# Patient Record
Sex: FEMALE | Race: WHITE
[De-identification: names, ages, dates, MRNs, and addresses within clinical notes are randomized per-mention and may not be internally consistent; named-entity substitution may affect disease eponyms.]

---

## 2018-08-20 ENCOUNTER — HOSPITAL ENCOUNTER (OUTPATIENT)
Dept: HOSPITAL 92 - SCSER | Age: 18
Discharge: HOME | End: 2018-08-20
Attending: SURGERY
Payer: COMMERCIAL

## 2018-08-20 DIAGNOSIS — K35.80: Primary | ICD-10-CM

## 2018-08-20 LAB
ALBUMIN SERPL BCG-MCNC: 4.4 G/DL (ref 3.5–5)
ALP SERPL-CCNC: 40 U/L (ref 40–150)
ALT SERPL W P-5'-P-CCNC: 11 U/L (ref 8–55)
ANION GAP SERPL CALC-SCNC: 15 MMOL/L (ref 10–20)
AST SERPL-CCNC: 17 U/L (ref 5–34)
BILIRUB SERPL-MCNC: 1 MG/DL (ref 0.2–1.2)
BUN SERPL-MCNC: 14 MG/DL (ref 7–18.7)
CALCIUM SERPL-MCNC: 9.7 MG/DL (ref 7.8–10.44)
CHLORIDE SERPL-SCNC: 105 MMOL/L (ref 98–107)
CO2 SERPL-SCNC: 22 MMOL/L (ref 22–29)
CREAT CL PREDICTED SERPL C-G-VRATE: 0 ML/MIN (ref 70–130)
GLOBULIN SER CALC-MCNC: 3.6 G/DL (ref 2.4–3.5)
GLUCOSE SERPL-MCNC: 94 MG/DL (ref 70–105)
HGB BLD-MCNC: 14.4 G/DL (ref 12–16)
LIPASE SERPL-CCNC: 13 U/L (ref 8–78)
MCH RBC QN AUTO: 27.8 PG (ref 25–35)
MCV RBC AUTO: 80.4 FL (ref 78–98)
MDIFF COMPLETE?: YES
PLATELET # BLD AUTO: 233 THOU/UL (ref 130–400)
PLATELET BLD QL SMEAR: (no result)
POTASSIUM SERPL-SCNC: 4.1 MMOL/L (ref 3.5–5.1)
PREGS CONTROL BACKGROUND?: (no result)
PREGS CONTROL BAR APPEAR?: YES
RBC # BLD AUTO: 5.16 MILL/UL (ref 4–5.2)
SODIUM SERPL-SCNC: 138 MMOL/L (ref 136–145)
SP GR UR STRIP: 1.01 (ref 1–1.03)
WBC # BLD AUTO: 16.7 THOU/UL (ref 4.8–10.8)

## 2018-08-20 PROCEDURE — 88304 TISSUE EXAM BY PATHOLOGIST: CPT

## 2018-08-20 PROCEDURE — 84703 CHORIONIC GONADOTROPIN ASSAY: CPT

## 2018-08-20 PROCEDURE — 96375 TX/PRO/DX INJ NEW DRUG ADDON: CPT

## 2018-08-20 PROCEDURE — 0DTJ4ZZ RESECTION OF APPENDIX, PERCUTANEOUS ENDOSCOPIC APPROACH: ICD-10-PCS | Performed by: SURGERY

## 2018-08-20 PROCEDURE — 80053 COMPREHEN METABOLIC PANEL: CPT

## 2018-08-20 PROCEDURE — 83690 ASSAY OF LIPASE: CPT

## 2018-08-20 PROCEDURE — 81003 URINALYSIS AUTO W/O SCOPE: CPT

## 2018-08-20 PROCEDURE — 96361 HYDRATE IV INFUSION ADD-ON: CPT

## 2018-08-20 PROCEDURE — 87491 CHLMYD TRACH DNA AMP PROBE: CPT

## 2018-08-20 PROCEDURE — 87510 GARDNER VAG DNA DIR PROBE: CPT

## 2018-08-20 PROCEDURE — 74177 CT ABD & PELVIS W/CONTRAST: CPT

## 2018-08-20 PROCEDURE — 87660 TRICHOMONAS VAGIN DIR PROBE: CPT

## 2018-08-20 PROCEDURE — 87480 CANDIDA DNA DIR PROBE: CPT

## 2018-08-20 PROCEDURE — 87591 N.GONORRHOEAE DNA AMP PROB: CPT

## 2018-08-20 PROCEDURE — 85025 COMPLETE CBC W/AUTO DIFF WBC: CPT

## 2018-08-20 PROCEDURE — 96374 THER/PROPH/DIAG INJ IV PUSH: CPT

## 2018-08-20 NOTE — CT
CT ABDOMEN WITH CONTRAST

CT PELVIS WITH CONTRAST:

 

HISTORY: 

Elevated white blood cell count.  Right lower quadrant pain.  Evaluate for appendicitis.

 

COMPARISON: 

None.

 

TECHNIQUE: 

A CT abdomen and pelvis is performed with IV contrast.  Enteric contrast was also administered.  Denisse
nal reformatted images are submitted.

 

FINDINGS: 

 

ABDOMEN CT:

Clear lung bases.  Normal heart size.  No pericardial fluid.  Visualized aorta has a normal caliber. 
 Portal vein is patent.  Unremarkable gallbladder.

 

Liver, spleen, pancreas, and adrenal glands have appropriate enhancement.

 

No gastrohepatic, retrocrural, or periportal lymphadenopathy.

 

Decreased intraabdominal fat limits evaluation for inflammatory change.  No mesenteric mass, lymphade
nopathy, free air, or significant free fluid.

 

Symmetric enhancement of the kidneys.  Bilaterally, no obstructive uropathy.

 

Gastric mucosa, duodenum, and small bowel loops are unremarkable.  Ileocecal junction is normal.  The
re is scattered fecal material in a nondistended, nondilated colon.  

 

Emanating from the cecal apex is a blunt-ending tubular structure with adjacent mesenteric stranding.
  This tubular structure measures 1.1 cm and is compatible with an inflamed appendix.  No evidence of
 perforation or abscess.  No free air.  

 

PELVIC CT:

Intrauterine device is noted.  No mass, lymphadenopathy, or free air.  There is a trace amount of kimberlee
e fluid.  Unremarkable urinary bladder.

 

There appear to be bilateral pars defects with presumed anterolisthesis of L5 upon S1.  Evaluation is
 incomplete.

 

IMPRESSION: 

Appendicitis without evidence of abscess or perforation. 

 

Results of the study discussed with Dr. Benavides 8/20/18 at 3:59 p.m.

 

CODE CR

 

POS: [facility]

## 2018-08-20 NOTE — OP
PREOPERATIVE DIAGNOSIS:  Acute appendicitis.

 

SURGEON:  Sung Nettles M.D.

 

PROCEDURE PERFORMED:  Laparoscopic appendectomy.

 

INDICATIONS:  This is a 18-year-old female who presented with a 24-hour history of right lower quadra
nt pain associated with nausea, vomiting.  CT showed appendicitis.

 

FINDINGS:  Acute suppurative nonperforated appendicitis.

 

PROCEDURE IN DETAIL:  After informed consent was obtained, the patient was taken to the operating coleen
m, given general endotracheal anesthesia, placed in the supine position.  Abdomen was prepped and citlaly
ped in usual fashion.  A subumbilical incision was performed.  The subcu divided sharply.  The fascia
 grasped and two stay sutures of 0 Vicryl placed on either side of midline.  Midline incised.  Digita
l palpation revealed no local adhesions.  A blunt 10-12 mm trocar inserted.  Pneumoperitoneum was cre
ated to a pressure of 15 mmHg.  Zero degree laparoscope inserted under direct vision, two 5 mm ports 
placed, one suprapubic and one right lateral abdomen.  The appendix was found.  The mesoappendix divi
ded utilizing the LigaSure.  The base of the appendix was divided utilizing the linear 45 mm white lo
ad stapler.  The appendix was placed in an Endosac and removed from the abdomen in an Endosac.  Hemos
tasis was assured.  The abdomen was irrigated, irrigation fluid removed.  The fascia closed with inte
rrupted 0 Vicryl suture.  The skin closed with interrupted 4-0 Rapide.  Steri-Strips applied.  Steril
e bandage applied.  The patient tolerated the procedure well and was transferred to recovery in good 
condition.  Sponge and needle count verified correct x2.